# Patient Record
Sex: FEMALE | Race: WHITE | URBAN - METROPOLITAN AREA
[De-identification: names, ages, dates, MRNs, and addresses within clinical notes are randomized per-mention and may not be internally consistent; named-entity substitution may affect disease eponyms.]

---
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
## 2021-02-09 ENCOUNTER — HOSPITAL ENCOUNTER (INPATIENT)
Age: 21
LOS: 4 days | Discharge: HOME | DRG: 885 | End: 2021-02-14
Attending: PSYCHIATRY & NEUROLOGY | Admitting: PSYCHIATRY & NEUROLOGY
Payer: COMMERCIAL

## 2021-02-09 DIAGNOSIS — R45.851 DEPRESSION WITH SUICIDAL IDEATION: Primary | ICD-10-CM

## 2021-02-09 DIAGNOSIS — F32.A DEPRESSION WITH SUICIDAL IDEATION: Primary | ICD-10-CM

## 2021-02-09 PROCEDURE — 99283 EMERGENCY DEPT VISIT LOW MDM: CPT

## 2021-02-09 PROCEDURE — 80307 DRUG TEST PRSMV CHEM ANLYZR: CPT

## 2021-02-10 PROBLEM — F33.9 MAJOR DEPRESSIVE DISORDER, RECURRENT (HCC): Status: ACTIVE | Noted: 2021-02-10

## 2021-02-10 PROBLEM — F33.2 SEVERE EPISODE OF RECURRENT MAJOR DEPRESSIVE DISORDER, WITHOUT PSYCHOTIC FEATURES (HCC): Status: ACTIVE | Noted: 2021-02-10

## 2021-02-10 LAB
ACETAMINOPHEN LEVEL: < 5 UG/ML (ref 0–20)
ALBUMIN SERPL-MCNC: 4.4 G/DL (ref 3.5–5.1)
ALP BLD-CCNC: 34 U/L (ref 38–126)
ALT SERPL-CCNC: 8 U/L (ref 11–66)
AMPHETAMINE+METHAMPHETAMINE URINE SCREEN: NEGATIVE
ANION GAP SERPL CALCULATED.3IONS-SCNC: 6 MEQ/L (ref 8–16)
AST SERPL-CCNC: 10 U/L (ref 5–40)
BARBITURATE QUANTITATIVE URINE: NEGATIVE
BASOPHILS # BLD: 0.3 %
BASOPHILS ABSOLUTE: 0 THOU/MM3 (ref 0–0.1)
BENZODIAZEPINE QUANTITATIVE URINE: NEGATIVE
BILIRUB SERPL-MCNC: 0.2 MG/DL (ref 0.3–1.2)
BUN BLDV-MCNC: 8 MG/DL (ref 7–22)
CALCIUM SERPL-MCNC: 9.6 MG/DL (ref 8.5–10.5)
CANNABINOID QUANTITATIVE URINE: POSITIVE
CHLORIDE BLD-SCNC: 104 MEQ/L (ref 98–111)
CO2: 26 MEQ/L (ref 23–33)
COCAINE METABOLITE QUANTITATIVE URINE: NEGATIVE
CREAT SERPL-MCNC: 0.4 MG/DL (ref 0.4–1.2)
EOSINOPHIL # BLD: 0.6 %
EOSINOPHILS ABSOLUTE: 0 THOU/MM3 (ref 0–0.4)
ERYTHROCYTE [DISTWIDTH] IN BLOOD BY AUTOMATED COUNT: 12.9 % (ref 11.5–14.5)
ERYTHROCYTE [DISTWIDTH] IN BLOOD BY AUTOMATED COUNT: 42.3 FL (ref 35–45)
ETHYL ALCOHOL, SERUM: < 0.01 %
GFR SERPL CREATININE-BSD FRML MDRD: > 90 ML/MIN/1.73M2
GLUCOSE BLD-MCNC: 90 MG/DL (ref 70–108)
HCT VFR BLD CALC: 38.7 % (ref 37–47)
HEMOGLOBIN: 12.5 GM/DL (ref 12–16)
IMMATURE GRANS (ABS): 0.02 THOU/MM3 (ref 0–0.07)
IMMATURE GRANULOCYTES: 0.3 %
LYMPHOCYTES # BLD: 24.4 %
LYMPHOCYTES ABSOLUTE: 1.9 THOU/MM3 (ref 1–4.8)
MCH RBC QN AUTO: 29 PG (ref 26–33)
MCHC RBC AUTO-ENTMCNC: 32.3 GM/DL (ref 32.2–35.5)
MCV RBC AUTO: 89.8 FL (ref 81–99)
MONOCYTES # BLD: 9.2 %
MONOCYTES ABSOLUTE: 0.7 THOU/MM3 (ref 0.4–1.3)
NUCLEATED RED BLOOD CELLS: 0 /100 WBC
OPIATES, URINE: NEGATIVE
OSMOLALITY CALCULATION: 269.8 MOSMOL/KG (ref 275–300)
OXYCODONE: NEGATIVE
PHENCYCLIDINE QUANTITATIVE URINE: NEGATIVE
PLATELET # BLD: 325 THOU/MM3 (ref 130–400)
PMV BLD AUTO: 10.3 FL (ref 9.4–12.4)
POTASSIUM SERPL-SCNC: 4.3 MEQ/L (ref 3.5–5.2)
PREGNANCY, SERUM: NEGATIVE
RBC # BLD: 4.31 MILL/MM3 (ref 4.2–5.4)
SALICYLATE, SERUM: < 0.3 MG/DL (ref 2–10)
SARS-COV-2, NAAT: NOT DETECTED
SEG NEUTROPHILS: 65.2 %
SEGMENTED NEUTROPHILS ABSOLUTE COUNT: 5.1 THOU/MM3 (ref 1.8–7.7)
SODIUM BLD-SCNC: 136 MEQ/L (ref 135–145)
TOTAL PROTEIN: 7.3 G/DL (ref 6.1–8)
WBC # BLD: 7.8 THOU/MM3 (ref 4.8–10.8)

## 2021-02-10 PROCEDURE — 6370000000 HC RX 637 (ALT 250 FOR IP): Performed by: PSYCHIATRY & NEUROLOGY

## 2021-02-10 PROCEDURE — APPSS30 APP SPLIT SHARED TIME 16-30 MINUTES: Performed by: PHYSICIAN ASSISTANT

## 2021-02-10 PROCEDURE — 84703 CHORIONIC GONADOTROPIN ASSAY: CPT

## 2021-02-10 PROCEDURE — 80179 DRUG ASSAY SALICYLATE: CPT

## 2021-02-10 PROCEDURE — 80053 COMPREHEN METABOLIC PANEL: CPT

## 2021-02-10 PROCEDURE — 82077 ASSAY SPEC XCP UR&BREATH IA: CPT

## 2021-02-10 PROCEDURE — 90792 PSYCH DIAG EVAL W/MED SRVCS: CPT | Performed by: PSYCHIATRY & NEUROLOGY

## 2021-02-10 PROCEDURE — U0002 COVID-19 LAB TEST NON-CDC: HCPCS

## 2021-02-10 PROCEDURE — 1240000000 HC EMOTIONAL WELLNESS R&B

## 2021-02-10 PROCEDURE — 80143 DRUG ASSAY ACETAMINOPHEN: CPT

## 2021-02-10 PROCEDURE — 36415 COLL VENOUS BLD VENIPUNCTURE: CPT

## 2021-02-10 PROCEDURE — 6370000000 HC RX 637 (ALT 250 FOR IP): Performed by: PHYSICIAN ASSISTANT

## 2021-02-10 PROCEDURE — 85025 COMPLETE CBC W/AUTO DIFF WBC: CPT

## 2021-02-10 RX ORDER — IBUPROFEN 200 MG
400 TABLET ORAL EVERY 6 HOURS PRN
Status: DISCONTINUED | OUTPATIENT
Start: 2021-02-10 | End: 2021-02-14 | Stop reason: HOSPADM

## 2021-02-10 RX ORDER — VENLAFAXINE HYDROCHLORIDE 37.5 MG/1
37.5 CAPSULE, EXTENDED RELEASE ORAL
Status: DISCONTINUED | OUTPATIENT
Start: 2021-02-10 | End: 2021-02-14 | Stop reason: HOSPADM

## 2021-02-10 RX ORDER — TRAZODONE HYDROCHLORIDE 50 MG/1
50 TABLET ORAL NIGHTLY PRN
Status: DISCONTINUED | OUTPATIENT
Start: 2021-02-10 | End: 2021-02-14 | Stop reason: HOSPADM

## 2021-02-10 RX ORDER — LORAZEPAM 1 MG/1
2 TABLET ORAL EVERY 6 HOURS PRN
Status: DISCONTINUED | OUTPATIENT
Start: 2021-02-10 | End: 2021-02-14 | Stop reason: HOSPADM

## 2021-02-10 RX ORDER — LORAZEPAM 2 MG/ML
2 INJECTION INTRAMUSCULAR EVERY 6 HOURS PRN
Status: DISCONTINUED | OUTPATIENT
Start: 2021-02-10 | End: 2021-02-14 | Stop reason: HOSPADM

## 2021-02-10 RX ORDER — HYDROXYZINE HYDROCHLORIDE 25 MG/1
50 TABLET, FILM COATED ORAL 3 TIMES DAILY PRN
Status: DISCONTINUED | OUTPATIENT
Start: 2021-02-10 | End: 2021-02-14 | Stop reason: HOSPADM

## 2021-02-10 RX ORDER — HALOPERIDOL 5 MG
5 TABLET ORAL EVERY 6 HOURS PRN
Status: DISCONTINUED | OUTPATIENT
Start: 2021-02-10 | End: 2021-02-14 | Stop reason: HOSPADM

## 2021-02-10 RX ORDER — MAGNESIUM HYDROXIDE/ALUMINUM HYDROXICE/SIMETHICONE 120; 1200; 1200 MG/30ML; MG/30ML; MG/30ML
30 SUSPENSION ORAL EVERY 6 HOURS PRN
Status: DISCONTINUED | OUTPATIENT
Start: 2021-02-10 | End: 2021-02-14 | Stop reason: HOSPADM

## 2021-02-10 RX ORDER — HALOPERIDOL 5 MG/ML
5 INJECTION INTRAMUSCULAR EVERY 6 HOURS PRN
Status: DISCONTINUED | OUTPATIENT
Start: 2021-02-10 | End: 2021-02-14 | Stop reason: HOSPADM

## 2021-02-10 RX ORDER — ACETAMINOPHEN 325 MG/1
650 TABLET ORAL EVERY 4 HOURS PRN
Status: DISCONTINUED | OUTPATIENT
Start: 2021-02-10 | End: 2021-02-14 | Stop reason: HOSPADM

## 2021-02-10 RX ADMIN — VENLAFAXINE HYDROCHLORIDE 37.5 MG: 37.5 CAPSULE, EXTENDED RELEASE ORAL at 10:23

## 2021-02-10 RX ADMIN — IBUPROFEN 400 MG: 200 TABLET, FILM COATED ORAL at 06:38

## 2021-02-10 RX ADMIN — TRAZODONE HYDROCHLORIDE 50 MG: 50 TABLET ORAL at 23:55

## 2021-02-10 ASSESSMENT — SLEEP AND FATIGUE QUESTIONNAIRES
RESTFUL SLEEP: NO
SLEEP PATTERN: DIFFICULTY FALLING ASLEEP;DISTURBED/INTERRUPTED SLEEP;DIFFICULTY ARISING
DIFFICULTY STAYING ASLEEP: YES
DIFFICULTY FALLING ASLEEP: YES

## 2021-02-10 ASSESSMENT — PAIN SCALES - GENERAL
PAINLEVEL_OUTOF10: 0
PAINLEVEL_OUTOF10: 0

## 2021-02-10 ASSESSMENT — ENCOUNTER SYMPTOMS
NAUSEA: 0
ABDOMINAL PAIN: 0
RHINORRHEA: 0
COUGH: 0
VOMITING: 0
SHORTNESS OF BREATH: 0

## 2021-02-10 ASSESSMENT — PAIN DESCRIPTION - FREQUENCY: FREQUENCY: INTERMITTENT

## 2021-02-10 ASSESSMENT — PAIN DESCRIPTION - PROGRESSION: CLINICAL_PROGRESSION: NOT CHANGED

## 2021-02-10 NOTE — GROUP NOTE
Group Therapy Note    Date: 2/10/2021    Group Start Time: 1330  Group End Time: 1430  Group Topic: Psychotherapy    [facility] 4E    Gwynne Bumpers, LSW        Group Therapy Note    Attendees: 4         Patient's Goal:  Read through and discuss ABCs for achieving dreams and apply to mental health. Notes:  Patient was interactive and contributed to the group conversation in a positive way. Status After Intervention:  Improved    Participation Level:  Active Listener and Interactive    Participation Quality: Appropriate, Attentive, Sharing and Supportive      Speech:  normal      Thought Process/Content: Logical      Affective Functioning: Congruent      Mood: euthymic      Level of consciousness:  Alert, Oriented x4 and Attentive      Response to Learning: Able to verbalize current knowledge/experience, Able to verbalize/acknowledge new learning, Able to retain information, Capable of insight, Able to change behavior and Progressing to goal      Endings: None Reported    Modes of Intervention: Education, Support, Socialization and Problem-solving      Discipline Responsible: /Counselor      Signature:  Gwynne Bumpers, LSW

## 2021-02-10 NOTE — PATIENT CARE CONFERENCE
[facility]  Initial Interdisciplinary Treatment Plan NOTE    Review Date & Time: 02/10/21 2498    Patient was in treatment team.  See Multidisciplinary Treatment Team sheet for participants. Admission Type:   Admission Type: Voluntary    Reason for admission:  Reason for Admission: suicidal thoughts, depression      Estimated Length of Stay Update:  3-5 days  Estimated Discharge Date Update: 3-5 days    PATIENT STRENGTHS:  Patient Strengths Strengths: Communication, Employment  Patient Strengths and Limitations:Limitations: Difficulty problem solving/relies on others to help solve problems, Tendency to isolate self  Addictive Behavior:Addictive Behavior  In the past 3 months, have you felt or has someone told you that you have a problem with:  : None  Do you have a history of Chemical Use?: No  Do you have a history of Alcohol Use?: No  Do you have a history of Street Drug Abuse?: Yes  Medical Problems:History reviewed. No pertinent past medical history. EDUCATION:   Learner Progress Toward Treatment Goals: Reviewed results and recommendations of this team, Reviewed group plan and strategies, Reviewed signs, symptoms and risk of self harm and violent behavior and Reviewed goals and plan of care    Method: Individual    Outcome: Needs reinforcement    PATIENT GOALS: Patient did not state a goal    PLAN/TREATMENT RECOMMENDATIONS UPDATE:   1. What is the most important thing we can help you with while you are here? Patient did not state. 2. Who is your support system? \"I have none\"  3. Do you have follow-up providers? No  4. Do you have the ability to pay for your medications? Yes, eleni. 5. Where will you be residing when you leave the hospital? Home with mother  6. Will need a return to work slip or FMLA paper completion?  Yes      GOALS UPDATE:   Time frame for Short-Term Goals: Daily    Maco Wood

## 2021-02-10 NOTE — BH NOTE
Behavioral Health   Admission Note     Admission Type:   Admission Type: Voluntary    Reason for admission:  Reason for Admission: suicidal thoughts, depression    PATIENT STRENGTHS:  Strengths: Communication, Employment    Patient Strengths and Limitations:  Limitations: Difficulty problem solving/relies on others to help solve problems, Tendency to isolate self    Addictive Behavior:   Addictive Behavior  In the past 3 months, have you felt or has someone told you that you have a problem with:  : None  Do you have a history of Chemical Use?: No  Do you have a history of Alcohol Use?: No  Do you have a history of Street Drug Abuse?: Yes    Medical Problems:   History reviewed. No pertinent past medical history. Status EXAM:  Status and Exam  Normal: No  Facial Expression: Worried, Sad  Affect: Blunt  Level of Consciousness: Alert  Mood:Normal: No  Mood: Depressed  Motor Activity:Normal: Yes  Interview Behavior: Cooperative  Preception: Wykoff to Person, Obadiah Lexii to Time, Wykoff to Place, Wykoff to Situation  Attention:Normal: Yes  Thought Processes: Circumstantial  Thought Content:Normal: Yes  Hallucinations: None  Delusions: No  Memory:Normal: Yes  Insight and Judgment: No  Insight and Judgment: Poor Insight  Present Suicidal Ideation: No  Present Homicidal Ideation: No    Pt admitted with followings belongings:  Dentures: None  Vision - Corrective Lenses: None  Hearing Aid: None  Jewelry: Ring  Body Piercings Removed: N/A  Clothing: Sweater, Socks, Undergarments (Comment), Jacket / coat, Shirt, Footwear(hat)  Were All Patient Medications Collected?: Not Applicable  Other Valuables: Cell phone, Money (Comment), Wallet($10)     Admission order obtained yes. Valuables placed in lock up on 4e. Patient's home medications were none. Patient oriented to surroundings and program expectations and copy of patient rights given. Received admission packet:  yes. Consents reviewed, signed yes. Patient verbalize understanding:  yes. Patient education on precautions: yes           2 person skin assessment completed upon admission pt refused 2 nurse assessment, denies any skin issues. Explained patients right to have family, representative or physician notified of their admission. Patient has Declined for physician to be notified. Patient has Declined for family/representative to be notified. 21 yo female pt admitted from ED, voluntary admit. Pt reports depression, suicidal thoughts, denies a plan, decreased sleep. Pt reports she was on Wellbutrin in past but didn't feel like it was working so pt quit taking it. Pt reports she smokes approximately 2 grams marijuana daily. Pt states she lives with her mom, works at [employer] in [place]. Pt reports her sister went to intermediate a week ago and she has been worried about her. Pt denies suicidal thoughts at present, denies homicidal thoughts or hallucinations. Pt oriented to her room and the unit.            Adeline Guan RN

## 2021-02-10 NOTE — ED NOTES
Pt lying comfortably in bed, mother at bedside.  No needs at this time     Girma Hyde  02/10/21 0252

## 2021-02-10 NOTE — ED NOTES
Pt comes in with mother through lobby. She has been having suicidal thoughts. She denies plan or intention at this time. She denies past history of suicidal attempt.       Chelsea Calabrese RN  02/10/21 2487

## 2021-02-10 NOTE — H&P
Department of Psychiatry  Psychiatric Assessment   Reason for Admission to Psychiatric Unit:  Threat to self requiring 24 hour professional observation  Concerns about patient's safety in the community    CHIEF COMPLAINT:  Suicidal ideation    HISTORY OF PRESENT ILLNESS:      Hua Almonte is a 22 y.o. female with a history of depression and anxiety who presented to the emergency department under voluntary status for suicidal ideation. Per the ED social work note: Patient is single and has no children. She is employed at [employer]. Patient resides with her family who are supportive. Patient reports feeling numb, sad, and difficulty with sleep. Patient reports she is sad often with suicidal thoughts . Patient states ' Today I wanted to kill myself more then the other days'. Patient reports she was prescribed medication with [facility] for depression however has not been compliant for the past six months. Patient reports the medication was not helping. Patient is tearful with fair eye contact. Patient is cooperative with the interview. Patient denies delusions/hallucinations. Patience reports \"I had a bad night. \" When asked why she had a bad night, \"I don't know. \" She states she has been experiencing suicidal ideation for some time that was the worse its ever been yesterday. She is unsure how long she has been experiencing suicidal ideation. She denies any specific plan or intent. She reports she has been experiencing depression for awhile that has been progressively getting worse. She denies any specific stressors. Per 4E nursing staff, her sister went to snf a week ago and she has been worried about her. She endorses feeling down and sad for more days than not. She has not been sleeping well. She has trouble falling and staying asleep. She endorses racing thoughts at night about the past and future. She still feels tired in the morning when she wakes up most days. She has been eating okay at home. She has been having low energy and motivation. Attention and concentration have been poor. She has been feeling worthless, hopeless and helpless. She has been dealing with high anxiety recently. She reports she experiences racing thoughts and is unable to calm down with her anxiety. She has been having panic attacks. She is unsure how often she has been having them. She states she experiences racing thoughts, racing heart, trouble breathing and is unable to calm down when she has a panic attack. She states her panic attacks last for an hour. She has a history of anxiety and depression. She does not currently have an outpatient psychiatric provider or take psychotropic medications. She was previously seeing a provider at [facility] and was on Wellbutrin but has been off of it for over 6 months because she felt it wasn't working. She denies any past suicide attempts but does have a history of self harm by cutting. She reports she has not cut for a few months. She denies any past inpatient psychiatric admissions. She denies any alcohol abuse. She does smoke marijuana daily. UDS positive for cannabis only. Patience appears down and flat on examination. She exhibits poor eye contact and speaks softly. She reports she is tired today as she arrived late to the unit last night. She continues to feel depressed and anxious today. Denies current thoughts of harm to herself or others. Denies and does not exhibit any symptoms of morales or hypomania. Denies any hallucinations. No evidence of delusions or overt psychosis on examination. She is receptive to starting an antidepressant medication. PSYCHIATRIC HISTORY:      · Outpatient psychiatric provider: Denies currently. Was previously seeing [facility]   · Suicide attempts: History of cutting to relieve stress. Has not cut for the past few months. · Inpatient psychiatric admissions: Denies    Past psychiatric medications includes:   Wellbutrin   Zoloft   Lexapro    Adverse reactions from psychotropic medications:    Zoloft- \"made me want to kill myself\"      Past Medical History:    History reviewed. No pertinent past medical history. Past Surgical History:        Procedure Laterality Date    OTHER SURGICAL HISTORY      lymphnodes removed in neck    WISDOM TOOTH EXTRACTION         Medications Prior to Admission:   No medications prior to admission. Allergies:  Patient has no known allergies.     Social History:   BORN & RAISED IN [place]  · RESIDENCE: Currently lives with her mom in [place] · LEVEL OF EDUCATION: graduated high school. Semester at high school  · MARITAL STATUS: Never . Not currently in a relationship  · CHILDREN: None  · OCCUPATION: Works at [employer]. Has been working there for the past 2 years. · PATIENT ASSETS: mother supportive, employment    DRUG USE HISTORY  Social History     Tobacco Use   Smoking Status Never Smoker   Smokeless Tobacco Never Used     Social History     Substance and Sexual Activity   Alcohol Use Never    Frequency: Never     Social History     Substance and Sexual Activity   Drug Use Yes    Types: Marijuana     Smokes marijuana daily     LEGAL HISTORY:   HISTORY OF INCARCERATION: no    Family Psychiatric and Medical History:   Denies any significant family psychiatric history  Denies suicides in family  Denies substance use in family    History reviewed. No pertinent family history. Psychiatric Review of Systems      ·    Obsessions and Compulsions: denies  ·    Nay or Hypomania: denies  ·    Hallucinations: denies  ·    Panic Attacks:  yes  ·    Delusions:  denies  ·    Phobias: denies       Medical Review of Systems:     Constitutional: Negative for appetite change, diaphoresis, fatigue and fever. HENT: Negative for congestion, sore throat and tinnitus. Eyes: Negative for visual disturbance. Respiratory: Negative for cough, shortness of breath and wheezing. Cardiovascular: Negative for chest pain and leg swelling. Gastrointestinal: Negative for nausea, vomiting, diarrhea. Negative for abdominal pain. Genitourinary: Negative for frequency. Musculoskeletal: Negative for arthralgias, myalgias and neck stiffness. Skin: Negative for puritis. Neurological: Negative for dizziness, weakness and headaches. All other systems reviewed and are negative.       PHYSICAL EXAM:  Vitals: /77   Pulse 69   Temp 97.9 °F (36.6 °C) (Oral)   Resp 16   Ht 5' 6\" (1.676 m)   Wt 170 lb (77.1 kg)   LMP 01/04/2021 (Approximate)   SpO2 98%   Breastfeeding No   BMI 27.44 kg/m²       Physical Exam:    Constitutional: Well developed, well nourished, no acute distress  Eyes: Pupils round and reactive to light bilaterally  Neck:  Supple, no thyromegaly. Cardiovascular:  Normal rate and rhythm, normal S1 and S2. No murmur or gallop on auscultation. Radial pulses 2+ and brisk bilaterally  Lungs: Clear to auscultation bilaterally without wheezing or rales. Musculoskeletal:  Full range of motion in all four extremities. Neurologic:  Cranial nerves II through XII are grossly intact. Normal gait and station. Mental Status Examination:    Level of consciousness:  awake  Appearance:  well-appearing, hospital attire, in chair, fair grooming and fair hygiene  Behavior/Motor:  psychomotor retardation  Attitude toward examiner:  cooperative, attentive and poor eye contact  Speech:  Low volume, slow and monotone  Mood:  Depressed  Affect:  flat  Thought processes:  linear, goal directed and coherent  Thought content:  Denies homicidal ideation  Suicidal Ideation:  Denies current suicidal ideation  Delusions:  no evidence of delusions  Perceptual Disturbance:  denies any perceptual disturbance  Cognition: Patient is oriented to person, place, time and situation  Concentration: clinically adequate  Memory: intact  Insight & Judgement: fair         DSM-5 DIAGNOSIS:    Severe episode 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 No pertinent past medical history.        TREATMENT PLAN Yenny Jacob is a 22 y.o. female being evaluated by a Virtual Visit (video visit) encounter to address concerns as mentioned above. A caregiver was present in the room along with the patient. Pursuant to the emergency declaration under the [facility], [address] [place] [address] authority and the [employer] Act, this Virtual Visit was conducted with patient's (and/or legal guardian's) consent, to reduce the patient's risk of exposure to COVID-19 and provide necessary medical care. Services were provided through a video synchronous discussion virtually to substitute for in-person visit by provider. Patient is present at [address] [place] [address], [address] [place] [address] and I am physically present at [place], [place]    --Sulma Gilbert MD on 2/10/2021 at 4:35 PM    An electronic signature was used to authenticate this note. **This report has been created using voice recognition software. It may contain minor errors which are inherent in voice recognition technology. **

## 2021-02-10 NOTE — PLAN OF CARE
Problem: Pain:  Goal: Control of acute pain  Description: Control of acute pain  Outcome: Met This Shift  Note: Pt denied any pain at this time      Problem: Depressive Behavior With or Without Suicide Precautions:  Goal: Able to verbalize and/or display a decrease in depressive symptoms  Description: Able to verbalize and/or display a decrease in depressive symptoms  Outcome: Ongoing  Note: Pt affect flat. Pt blunt and denied any SI at this time   Goal: Ability to disclose and discuss suicidal ideas will improve  Description: Ability to disclose and discuss suicidal ideas will improve  Outcome: Ongoing  Note: Pt denied any SI   Goal: Participates in care planning  Description: Participates in care planning  Outcome: Ongoing  Note: Patient discussed treatment plan with physician/medical staff, attending group, and compliant with medications. Problem: Discharge Planning:  Goal: Discharged to appropriate level of care  Description: Discharged to appropriate level of care  Outcome: Ongoing  Note: Patient voices emotional  needs before discharge. Patient plans to be discharged to home with her Mom. Discharge planner working with patient to achieve optimal discharge plans, specific to individual needs. Problem: Activity:  Goal: Sleeping patterns will improve  Description: Sleeping patterns will improve  Outcome: Completed  Note: Patient slept 1.5 hours last night, reports they slept well. Encourage patient not to take naps during the day, relax several hours before bed and to take prescribed sleep meds as ordered. Patient  verbalized understanding.

## 2021-02-10 NOTE — PLAN OF CARE
Patient has attended at least one group today and has also been out of her room at times for social interaction with others so she is working toward her socialization goal for this shift. Patient will be encouraged to attend all groups on the unit daily during her hospital stay.

## 2021-02-10 NOTE — ED PROVIDER NOTES
[facility] EMERGENCY DEPT      CHIEF COMPLAINT       Chief Complaint   Patient presents with    Suicidal       Nurses Notes reviewed and I agree except as noted in the HPI. HISTORY OF PRESENT ILLNESS    Niyah Brar is a 22 y.o. female who presents for depression with suicidal ideation. Patient did not want to talk about why she was here to me. Patient denies all medical complaints. Per Yamileth Christie from [facility] patient has a history of depression and stopped taking her medications 6 months ago. Depression has worsened especially 2 weeks ago after her sister went to [facility]. Patient has been having trouble sleeping and admits to having suicidal thoughts. She did not share if she had a plan. Patient denies any ingestion or self injury to me. Patient does not smoke tobacco or drink alcohol. Patient admits to smoking marijuana. Patient denies chance of pregnancy. REVIEW OF SYSTEMS     Review of Systems   Constitutional: Negative for appetite change and fever. HENT: Negative for congestion and rhinorrhea. Eyes: Negative for visual disturbance. Respiratory: Negative for cough and shortness of breath. Cardiovascular: Negative for chest pain. Gastrointestinal: Negative for abdominal pain, nausea and vomiting. Genitourinary: Negative for decreased urine volume. Musculoskeletal: Negative for gait problem. Skin: Negative for rash and wound. Neurological: Negative for headaches. Psychiatric/Behavioral: Positive for dysphoric mood, sleep disturbance and suicidal ideas. Negative for hallucinations and self-injury. PAST MEDICAL HISTORY    has no past medical history on file. SURGICAL HISTORY      has no past surgical history on file. CURRENT MEDICATIONS       Previous Medications    No medications on file       ALLERGIES     has No Known Allergies. FAMILY HISTORY     has no family status information on file. family history is not on file.     SOCIAL HISTORY reports that she has never smoked. She has never used smokeless tobacco. She reports previous drug use. PHYSICAL EXAM     INITIAL VITALS:  height is 5' 6\" (1.676 m) and weight is 170 lb (77.1 kg). Her oral temperature is 98.2 °F (36.8 °C). Her blood pressure is 140/83 (abnormal) and her pulse is 86. Her respiration is 17 and oxygen saturation is 99%. Physical Exam  Vitals signs and nursing note reviewed. Constitutional:       General: She is not in acute distress. Appearance: She is well-developed. She is not toxic-appearing or diaphoretic. HENT:      Head: Normocephalic and atraumatic. Right Ear: Hearing normal.      Left Ear: Hearing normal.      Nose: Nose normal. No rhinorrhea. Mouth/Throat:      Pharynx: Uvula midline. No oropharyngeal exudate. Eyes:      General: Lids are normal. No scleral icterus. Conjunctiva/sclera: Conjunctivae normal.      Pupils: Pupils are equal, round, and reactive to light. Neck:      Musculoskeletal: Normal range of motion and neck supple. No neck rigidity. Trachea: No tracheal deviation. Cardiovascular:      Rate and Rhythm: Normal rate and regular rhythm. Heart sounds: Normal heart sounds. No murmur. Pulmonary:      Effort: Pulmonary effort is normal. No respiratory distress. Breath sounds: Normal breath sounds. No stridor. No decreased breath sounds or wheezing. Abdominal:      General: There is no distension. Palpations: Abdomen is soft. Abdomen is not rigid. Tenderness: There is no abdominal tenderness. Musculoskeletal: Normal range of motion. Comments: Movement normal as observed   Lymphadenopathy:      Cervical: No cervical adenopathy. Skin:     General: Skin is warm and dry. Coloration: Skin is not pale. Findings: No rash. Neurological:      Mental Status: She is alert and oriented to person, place, and time.       Gait: Gait normal.      Comments: No gross deficits observed Psychiatric:         Mood and Affect: Mood is depressed. Speech: Speech normal.         Behavior: Behavior normal. Behavior is cooperative. Thought Content: Thought content includes suicidal ideation. Comments: Quiet, poor eye contact, guarded         DIFFERENTIAL DIAGNOSIS:   Including but not limited to: Depression, suicidal ideation, adjustment disorder, bipolar    DIAGNOSTIC RESULTS     EKG: All EKG's are interpreted by [facility] Department Physician who either signs or Co-signs this chart in the absence of a cardiologist.  None    RADIOLOGY: non-plain film images(s) such as CT,Ultrasound and MRI are read by the radiologist.  Plain radiographic images are visualized and preliminarily interpreted by the emergency physician unless otherwise stated below.   No orders to display       LABS:   Labs Reviewed   COMPREHENSIVE METABOLIC PANEL - Abnormal; Notable for the following components:       Result Value    Alkaline Phosphatase 34 (*)     Total Bilirubin 0.2 (*)     ALT 8 (*)     All other components within normal limits   SALICYLATE LEVEL - Abnormal; Notable for the following components:    Salicylate, Serum < 0.3 (*)     All other components within normal limits   OSMOLALITY - Abnormal; Notable for the following components:    Osmolality Calc 269.8 (*)     All other components within normal limits   ANION GAP - Abnormal; Notable for the following components:    Anion Gap 6.0 (*)     All other components within normal limits   URINE DRUG SCREEN   CBC WITH AUTO DIFFERENTIAL   ACETAMINOPHEN LEVEL   ETHANOL   HCG, SERUM, QUALITATIVE   GLOMERULAR FILTRATION RATE, ESTIMATED   COVID-19   COVID-19       EMERGENCY DEPARTMENT COURSE:   Vitals:    Vitals:    02/09/21 2343   BP: (!) 140/83   Pulse: 86   Resp: 17   Temp: 98.2 °F (36.8 °C)   TempSrc: Oral   SpO2: 99%   Weight: 170 lb (77.1 kg)   Height: 5' 6\" (1.676 m)       MDM: The patient was seen in emergency room by me for pression with suicidal ideation. Old records were reviewed. Physical exam revealed a quiet depressed patient. Appropriate labs were ordered. The patient was closely observed and vital signs monitored. Labs were reviewed upon completion. Labs reflected no acute abnormalities. The patient was thoroughly evaluated by Fox Noland from [facility], who consulted Dr. Charla Holden of psychiatry, who advised admission. The patient was admitted to the hospital voluntarily in fair condition. All questions were addressed. CRITICAL CARE:   None    CONSULTS:  None    PROCEDURES:  None    FINAL IMPRESSION      1. Depression with suicidal ideation          DISPOSITION/PLAN     1.  Depression with suicidal ideation    Admission    (Please note that portions of this note were completed with a voice recognition program.  Efforts were made to edit the dictations but occasionally words are mis-transcribed.)    Jennifer Lua PA-C 02/10/21 2:30 AM    ADAMS Hayden PA-C  02/10/21 0236

## 2021-02-10 NOTE — PROGRESS NOTES
Provisional Diagnosis:   Major Depressive Disorder Recurrent Moderate without Psychotic Features. Risk, Psychosocial and Contextual Factors: No current treatment for mental health/substance use. .     Current MH Treatment: History with [facility], stopped taking medication six months ago        Present Suicidal Behavior:      Verbal: xxx         Attempt: Denies    Access to Weapons:  Denies    Current Suicide Risk: Low, Moderate or High:  Moderate      Past Suicidal Behavior:       Verbal: xxx    Attempt: Denies    Self-Injurious/Self-Mutilation: Denies    Traumatic Event Within Past 2 Weeks:   Sister is incarcerated. Current Abuse: Denies    Legal: Denies    Violence:  Denies     Protective Factors:  Family support. Housing:    Resides with family. CPAP/Oxygen/Ambulation Difficulties: NA    Basic Vital Signs Normal?: Check with Patients Nurse prior to Calling Psychiatry    Critical Labs?: Check with Patients Nurse prior to Calling Psychiatry    Clinical Summary:      Patient is a [de-identified] year old female presenting with her mother under voluntary status. Patient is single and has no children. She is employed at [employer]. Patient resides with her family who are supportive. Patient reports feeling numb, sad, and difficulty with sleep. Patient reports she is sad often with suicidal thoughts . Patient states ' Today I wanted to kill myself more then the other days'. Patient reports she was prescribed medication with [facility] for depression however has not been compliant for the past six months. Patient reports the medication was not helping. Patient is tearful with fair eye contact. Patient is cooperative with the interview. Patient denies delusions/hallucinations. 01:30 Patient is awake, mother at bedside. Monitored by frooly. 02:30 Patient is resting. Mother at bedside.  Monitored by frooly  Level of Care Disposition: Consulted with Hilda Garcia PA-C concerning the mental status of patient. Consulted with patients RN about abnormalities or medical concerns. Consulted with Dr. Fam Hall concerning the mental status of patient. Patient is accepted to the care of Dr. Fam Hall under voluntary status for mental health/substance use treatment.

## 2021-02-10 NOTE — BH NOTE
COORDINATION: CK - Patient in bed. MOBILITY:   Ambulates independently      GOALS:  (1) Identify 2 new positive coping skills by time of discharge. (2) Increase socialization by attending groups and coming out of her room for social interaction daily.

## 2021-02-10 NOTE — GROUP NOTE
Group Therapy Note    Date: 2/10/2021    Group Start Time: 1100  Group End Time: 1648  Group Topic: Healthy Living/Wellness    [facility] Adult [facility] 4E    Mariana Lanier RN        Group Therapy Note    Attendees: 3Topic     Start 1100    End 4337    Participation Active       Response Receptive to group subject matter      Behavior appropriate

## 2021-02-10 NOTE — PROGRESS NOTES
[facility] Biopsychosocial Assessment    Current Level of Psychosocial Functioning     Independent XXX  Dependent    Minimal Assist     Comments:      Psychosocial High Risk Factors (check all that apply)    Unable to obtain meds   Chronic illness/pain    Substance abuse   Lack of Family Support XXX  Financial stress   Isolation XXX  Inadequate Community Resources  Suicide attempt(s)  Not taking medications   Victim of crime   Developmental Delay  Unable to manage personal needs    Age 72 or older   Homeless  No transportation   Readmission within 30 days  Unemployment  Traumatic Event    Comments:   Sexual Orientation:  Heterosexual    Patient Strengths: Employment    Patient Barriers: Difficulty problem solving, isolate self    Plan of Care     medication management, group/individual therapies, family meetings, psycho -education, treatment team meetings to assist with stabilization    Initial Discharge Plan:        Clinical Summary:  Patient is a 22year old female who presents to the unit under voluntary status. Patient was brought in by her mother due to feeling sad and frequent suicidal thoughts. Patient has not been previously admitted to the unit. When asked while she was here, Patient states \"Iwas just having a bad day worse than usual.\" Patient was very guarded and hostile during the assessment. Patient states she did not have a plan to commit suicide, but has constant suicide thoughts daily. Patient does not give homicidal ideations. Patient states she lives with her mother at home. Patient currently does not see an outpatient provider. Patient states she currently works at [employer]. Patient denies alcohol or drug abuse.

## 2021-02-11 PROCEDURE — 1240000000 HC EMOTIONAL WELLNESS R&B

## 2021-02-11 PROCEDURE — 6370000000 HC RX 637 (ALT 250 FOR IP): Performed by: PSYCHIATRY & NEUROLOGY

## 2021-02-11 PROCEDURE — 90833 PSYTX W PT W E/M 30 MIN: CPT | Performed by: PSYCHIATRY & NEUROLOGY

## 2021-02-11 PROCEDURE — 6370000000 HC RX 637 (ALT 250 FOR IP): Performed by: PHYSICIAN ASSISTANT

## 2021-02-11 PROCEDURE — 99232 SBSQ HOSP IP/OBS MODERATE 35: CPT | Performed by: PSYCHIATRY & NEUROLOGY

## 2021-02-11 RX ADMIN — TRAZODONE HYDROCHLORIDE 50 MG: 50 TABLET ORAL at 21:54

## 2021-02-11 RX ADMIN — VENLAFAXINE HYDROCHLORIDE 37.5 MG: 37.5 CAPSULE, EXTENDED RELEASE ORAL at 07:44

## 2021-02-11 ASSESSMENT — PAIN SCALES - GENERAL: PAINLEVEL_OUTOF10: 0

## 2021-02-11 NOTE — PROGRESS NOTES
height is 5' 6\" (1.676 m) and weight is 170 lb (77.1 kg). Her oral temperature is 98 °F (36.7 °C). Her blood pressure is 120/60 and her pulse is 73. Her respiration is 16 and oxygen saturation is 98%. Labs:   Admission on 02/09/2021   Component Date Value Ref Range Status    AMPHETAMINE+METHAMPHETAMINE URINE * 02/09/2021 Negative  NEGATIVE Final    Barbiturate Quant, Ur 02/09/2021 Negative  NEGATIVE Final    Benzodiazepine Quant, Ur 02/09/2021 Negative  NEGATIVE Final    Cannabinoid Quant, Ur 02/09/2021 POSITIVE  NEGATIVE Final    Cocaine Metab Quant, Ur 02/09/2021 Negative  NEGATIVE Final    Opiates, Urine 02/09/2021 Negative  NEGATIVE Final    Oxycodone 02/09/2021 Negative  NEGATIVE Final    PCP Quant, Ur 02/09/2021 Negative  NEGATIVE Final    Comment: A \"Negative\" result for a drug abuse screen test indicates     that the drug concentration is below the following cutoffs:            Amphetamine/Methamphetamine     1000 ng/ml            Barbiturate                      200 ng/ml            Benzodiazapine                   200 ng/ml            Cannabinoids                      50 ng/ml            Cocaine Metabolite               300 ng/ml            Opiates                          300 ng/ml            Oxycodone                        100 ng/ml            Phencyclidine                     25 ng/ml  A \"Positive\" result for a drug abuse screen test should be     considered presumptive positive until/unless confirmed     by another method. (Additional request)  Quantitative values from a reference laboratory are     available upon additional request.  These results are for medical use only.   Performed at [address] [place] [address], [address] [place] [address] WBC 02/10/2021 7.8  4.8 - 10.8 thou/mm3 Final    RBC 02/10/2021 4.31  4.20 - 5.40 mill/mm3 Final    Hemoglobin 02/10/2021 12.5  12.0 - 16.0 gm/dl Final    Hematocrit 02/10/2021 38.7  37.0 - 47.0 % Final  MCV 02/10/2021 89.8  81.0 - 99.0 fL Final    MCH 02/10/2021 29.0  26.0 - 33.0 pg Final    MCHC 02/10/2021 32.3  32.2 - 35.5 gm/dl Final    RDW-CV 02/10/2021 12.9  11.5 - 14.5 % Final    RDW-SD 02/10/2021 42.3  35.0 - 45.0 fL Final    Platelets 94/65/5614 325  130 - 400 thou/mm3 Final    MPV 02/10/2021 10.3  9.4 - 12.4 fL Final    Seg Neutrophils 02/10/2021 65.2  % Final    Lymphocytes 02/10/2021 24.4  % Final    Monocytes 02/10/2021 9.2  % Final    Eosinophils 02/10/2021 0.6  % Final    Basophils 02/10/2021 0.3  % Final    Immature Granulocytes 02/10/2021 0.3  % Final    Segs Absolute 02/10/2021 5.1  1.8 - 7.7 thou/mm3 Final    Lymphocytes Absolute 02/10/2021 1.9  1.0 - 4.8 thou/mm3 Final    Monocytes Absolute 02/10/2021 0.7  0.4 - 1.3 thou/mm3 Final    Eosinophils Absolute 02/10/2021 0.0  0.0 - 0.4 thou/mm3 Final    Basophils Absolute 02/10/2021 0.0  0.0 - 0.1 thou/mm3 Final    Immature Grans (Abs) 02/10/2021 0.02  0.00 - 0.07 thou/mm3 Final    nRBC 02/10/2021 0  /100 wbc Final    Performed at [facility], [address]    Glucose 02/10/2021 90  70 - 108 mg/dL Final    CREATININE 02/10/2021 0.4  0.4 - 1.2 mg/dL Final    BUN 02/10/2021 8  7 - 22 mg/dL Final    Sodium 02/10/2021 136  135 - 145 meq/L Final    Potassium 02/10/2021 4.3  3.5 - 5.2 meq/L Final    Chloride 02/10/2021 104  98 - 111 meq/L Final    CO2 02/10/2021 26  23 - 33 meq/L Final    Calcium 02/10/2021 9.6  8.5 - 10.5 mg/dL Final    AST 02/10/2021 10  5 - 40 U/L Final    Alkaline Phosphatase 02/10/2021 34* 38 - 126 U/L Final    Total Protein 02/10/2021 7.3  6.1 - 8.0 g/dL Final    Albumin 02/10/2021 4.4  3.5 - 5.1 g/dL Final    Total Bilirubin 02/10/2021 0.2* 0.3 - 1.2 mg/dL Final    ALT 02/10/2021 8* 11 - 66 U/L Final    Performed at [address] [place] [id number]    Acetaminophen Level 02/10/2021 < 5.0  0.0 - 20.0 ug/mL Final Electronically signed by Hanna Gutierrez MD on 2/11/21 at 5:46 PM EST    **This report has been created using voice recognition software. It may contain minor errors which are inherent in voice recognition technology. **

## 2021-02-11 NOTE — PLAN OF CARE
Problem: Depressive Behavior With or Without Suicide Precautions:  Goal: Able to verbalize and/or display a decrease in depressive symptoms  Description: Able to verbalize and/or display a decrease in depressive symptoms  2/11/2021 0837 by Filomena Gandara RN  Outcome: Ongoing  Note: Pt affect flat but she brightens with peers   2/10/2021 2335 by Rosey Armendariz RN  Outcome: Ongoing  Note: Patient states she continues to feel moderately depressed this shift. Patient noted with a blunt affect and brightens with interaction. Goal: Ability to disclose and discuss suicidal ideas will improve  Description: Ability to disclose and discuss suicidal ideas will improve  2/11/2021 0837 by Filomena Gandara RN  Outcome: Ongoing  Note: Pt denied any SI   2/10/2021 2335 by Rosey Armendariz RN  Outcome: Ongoing  Note: Patient denies any suicidal thoughts so far this shift. Goal: Participates in care planning  Description: Participates in care planning  2/11/2021 0837 by Filomena Gandara RN  Outcome: Ongoing  Note: Patient discussed treatment plan with physician/medical staff, attending group, and compliant with medications. 2/10/2021 2335 by Rosey Armendariz RN  Outcome: Ongoing  Note: Care plan reviewed with patient and fair understanding verbalized. Problem: Discharge Planning:  Goal: Discharged to appropriate level of care  Description: Discharged to appropriate level of care  2/11/2021 0837 by Filomena Gandara RN  Outcome: Ongoing  Note: Patient voices emotional  needs before discharge. Patient plans to be discharged to home with her Mom. Discharge planner working with patient to achieve optimal discharge plans, specific to individual needs. 2/10/2021 2335 by Rosey Armendariz RN  Outcome: Ongoing  Note: Patient states she plans to return home with her mother at discharge.      Problem: Pain:  Goal: Control of acute pain  Description: Control of acute pain  2/11/2021 0837 by Filomena Gandara RN Outcome: Completed  Note: Pt denied any pain  2/10/2021 2335 by Aishwarya Rodriguez RN  Outcome: Ongoing  Note: Patient denies any pain so far this shift. Problem: Coping:  Goal: Ability to identify problematic behaviors that deter socialization will improve  Description: Ability to identify problematic behaviors that deter socialization will improve  2/11/2021 0837 by Chemo Turner RN  Outcome: Completed  Note: Pt able to be appropriately social with peers. 2/10/2021 2335 by Aishwarya Rodriguez RN  Outcome: Ongoing  Note: Ongoing.

## 2021-02-11 NOTE — GROUP NOTE
Group Therapy Note    Date: 2/11/2021    Group Start Time: 1330  Group End Time: 0950  Group Topic: Psychotherapy    [facility] 4E    FÉLIX Otero        Group Therapy Note    Attendees: 5         Patient's Goal:  Identify skills to use upon discharge when feeling anxious, sad, overwhelmed, etc.     Notes:  Patient contributed to group conversation in a positive way. Patient was pleasant and encouraged peers to participate as well. Status After Intervention:  Improved    Participation Level:  Active Listener and Interactive    Participation Quality: Appropriate, Attentive, Sharing and Supportive      Speech:  normal      Thought Process/Content: Logical      Affective Functioning: Congruent      Mood: euthymic      Level of consciousness:  Alert, Oriented x4 and Attentive      Response to Learning: Able to verbalize current knowledge/experience, Able to verbalize/acknowledge new learning, Able to retain information, Capable of insight, Able to change behavior and Progressing to goal      Endings: None Reported    Modes of Intervention: Support, Socialization and Problem-solving      Discipline Responsible: /Counselor

## 2021-02-11 NOTE — PLAN OF CARE
Patient has attended at least 2 groups today and has also been out of her room to socialize with others at times so she has met her socialization goal for this shift.

## 2021-02-11 NOTE — BH NOTE
PLAN OF CARE:     Start Time: 0900  End Time:  0930    Group Topic:  Daily Goals    Group Type:   Goal Group    Intervention/Goal:  To increase support and identify daily goals    Attendance:  attended    Affect:   restricted    Behavior:  Cooperative but evasive    Response:  Identified a daily goal    Daily Goal:    Talk to the doctor. Come to groups.      Progress:  Goal met

## 2021-02-11 NOTE — BH NOTE
Group Therapy Note    Date: 2/10/2021  Start Time: 2000  End Time:  2020  Number of Participants: 1    Type of Group: Wrap-Up/Relaxation    Wellness Binder Information  Module Name:  None  Session Number:  None    Patient's Goal:  To be ok    Notes:  Ongoing    Status After Intervention:  Improved    Participation Level: Interactive    Participation Quality: Appropriate      Speech:  normal      Thought Process/Content: Logical      Affective Functioning: Blunted      Mood: depressed      Level of consciousness:  Oriented x4      Response to Learning: Capable of insight      Endings: None Reported    Modes of Intervention: Education      Discipline Responsible: Registered Nurse      Signature:   Kuldip Bonner RN

## 2021-02-11 NOTE — PLAN OF CARE
Problem: Depressive Behavior With or Without Suicide Precautions:  Goal: Able to verbalize and/or display a decrease in depressive symptoms  Description: Able to verbalize and/or display a decrease in depressive symptoms  2/10/2021 2335 by Freddy George RN  Outcome: Ongoing  Note: Patient states she continues to feel moderately depressed this shift. Patient noted with a blunt affect and brightens with interaction. 2/10/2021 1035 by Messi Paige RN  Outcome: Ongoing  Note: Pt affect flat. Pt blunt and denied any SI at this time   Goal: Ability to disclose and discuss suicidal ideas will improve  Description: Ability to disclose and discuss suicidal ideas will improve  2/10/2021 2335 by Freddy George RN  Outcome: Ongoing  Note: Patient denies any suicidal thoughts so far this shift. 2/10/2021 1035 by Messi Paige RN  Outcome: Ongoing  Note: Pt denied any SI   Goal: Participates in care planning  Description: Participates in care planning  2/10/2021 2335 by Freddy George RN  Outcome: Ongoing  Note: Care plan reviewed with patient and fair understanding verbalized. 2/10/2021 1035 by Messi Paige RN  Outcome: Ongoing  Note: Patient discussed treatment plan with physician/medical staff, attending group, and compliant with medications. Problem: Discharge Planning:  Goal: Discharged to appropriate level of care  Description: Discharged to appropriate level of care  2/10/2021 2335 by Freddy eGorge RN  Outcome: Ongoing  Note: Patient states she plans to return home with her mother at discharge. 2/10/2021 1035 by Messi Paige RN  Outcome: Ongoing  Note: Patient voices emotional  needs before discharge. Patient plans to be discharged to home with her Mom. Discharge planner working with patient to achieve optimal discharge plans, specific to individual needs. Problem:  Activity:  Goal: Sleeping patterns will improve  Description: Sleeping patterns will improve 2/10/2021 1035 by Chemo uTrner RN  Outcome: Completed  Note: Patient slept 1.5 hours last night, reports they slept well. Encourage patient not to take naps during the day, relax several hours before bed and to take prescribed sleep meds as ordered. Patient  verbalized understanding. Problem: Pain:  Goal: Control of acute pain  Description: Control of acute pain  2/10/2021 2335 by Aishwarya Rodriguez RN  Outcome: Ongoing  Note: Patient denies any pain so far this shift. 2/10/2021 1035 by Chemo Turner RN  Outcome: Met This Shift  Note: Pt denied any pain at this time      Problem: Coping:  Goal: Ability to identify problematic behaviors that deter socialization will improve  Description: Ability to identify problematic behaviors that deter socialization will improve  Outcome: Ongoing  Note: Ongoing. Care plan reviewed with patient.   Patient does verbalize understanding of the plan of care and does contribute to goal setting

## 2021-02-12 PROCEDURE — 6370000000 HC RX 637 (ALT 250 FOR IP): Performed by: PHYSICIAN ASSISTANT

## 2021-02-12 PROCEDURE — 1240000000 HC EMOTIONAL WELLNESS R&B

## 2021-02-12 PROCEDURE — APPSS30 APP SPLIT SHARED TIME 16-30 MINUTES: Performed by: PHYSICIAN ASSISTANT

## 2021-02-12 PROCEDURE — 99232 SBSQ HOSP IP/OBS MODERATE 35: CPT | Performed by: PSYCHIATRY & NEUROLOGY

## 2021-02-12 PROCEDURE — 90833 PSYTX W PT W E/M 30 MIN: CPT | Performed by: PSYCHIATRY & NEUROLOGY

## 2021-02-12 PROCEDURE — 6370000000 HC RX 637 (ALT 250 FOR IP): Performed by: PSYCHIATRY & NEUROLOGY

## 2021-02-12 RX ADMIN — VENLAFAXINE HYDROCHLORIDE 37.5 MG: 37.5 CAPSULE, EXTENDED RELEASE ORAL at 07:49

## 2021-02-12 RX ADMIN — TRAZODONE HYDROCHLORIDE 50 MG: 50 TABLET ORAL at 21:03

## 2021-02-12 ASSESSMENT — PAIN SCALES - GENERAL: PAINLEVEL_OUTOF10: 0

## 2021-02-12 NOTE — PROGRESS NOTES
Group Therapy Note    Date: 2/11/2021  Start Time: 2000  End Time:  2020  Number of Participants:     Type of Group: Wrap-Up    Wellness Binder Information  Module Name:    Session Number:      Patient's Goal:  To go to groups     Notes: Attended groups     Status After Intervention:  Unchanged    Participation Level:  Active Listener and Interactive    Participation Quality: appropriate       Speech:  normal      Thought Process/Content: Logical      Affective Functioning: Flat      Mood: depressed      Level of consciousness:  Alert      Response to Learning: Able to verbalize current knowledge/experience, Able to verbalize/acknowledge new learning and Able to retain information      Endings: None Reported    Modes of Intervention: Support and Socialization      Discipline Responsible: Registered Nurse      Signature:  Chi Hall RN

## 2021-02-12 NOTE — BH NOTE
Group Therapy Note    Date: 2/12/2021     Start Time: 1000  End Time:   1704    Number of Participants:  8    Type of Group: Recreational/Social    Patient's Goal:  Increase socialization and concentration. Notes:   Social with others at times. Patient demonstrated good concentration while participating in [facility].      Status After Intervention:  Improved    Participation Level: Interactive    Participation Quality: Appropriate and Attentive      Speech:  hesitant      Thought Process/Content: Logical      Affective Functioning: Congruent      Mood: euthymic      Level of consciousness:  Oriented x4      Response to Learning: Progressing to goal      Endings: None Reported    Modes of Intervention: Socialization and Activity      Discipline Responsible: Psychoeducational Specialist      Signature:  Dane Sever

## 2021-02-12 NOTE — PLAN OF CARE
Problem: Depressive Behavior With or Without Suicide Precautions:  Goal: Able to verbalize and/or display a decrease in depressive symptoms  Description: Able to verbalize and/or display a decrease in depressive symptoms  2/12/2021 0934 by Lakia Guo RN  Outcome: Ongoing  Note: Pr affect flat. Pt denied any SI at this time   2/11/2021 2342 by Akin Perez RN  Outcome: Met This Shift  Note: States her depression is less. Rates her mood a 6 with 10 being the best. Affect flat, sad. Fair eye contact. States she is hopeful for the future. Select peer interaction with a male peer. Goal: Participates in care planning  Description: Participates in care planning  2/12/2021 0934 by Lakia Guo RN  Outcome: Ongoing  Note: Patient discussed treatment plan with physician/medical staff, attending group, and compliant with medications. 2/11/2021 2342 by Akin Perez RN  Outcome: Met This Shift  Note: Care plan reviewed with patient and verbalize understanding of the plan of care and contribute to goal setting. Problem: Discharge Planning:  Goal: Discharged to appropriate level of care  Description: Discharged to appropriate level of care  2/12/2021 0934 by Lakia Guo RN  Outcome: Ongoing  Note: Patient voices no needs before discharge. Patient plans to be discharged to home . Discharge planner working with patient to achieve optimal discharge plans, specific to individual needs. 2/11/2021 2342 by Akin Perez RN  Outcome: Ongoing  Note: Discharge planning in progress.       Problem: Coping:  Goal: Ability to identify problematic behaviors that deter socialization will improve  Description: Ability to identify problematic behaviors that deter socialization will improve  2/12/2021 0934 by Lakia Guo RN  Outcome: Ongoing  Note: Pt interacting well with staff and select peers  2/11/2021 2342 by Akin Perez RN  Outcome: Ongoing Problem: Depressive Behavior With or Without Suicide Precautions:  Goal: Ability to disclose and discuss suicidal ideas will improve  Description: Ability to disclose and discuss suicidal ideas will improve  2/12/2021 0934 by Tiago Durham RN  Outcome: Completed  2/11/2021 2342 by Franky Barraza RN  Outcome: Met This Shift  Note: Denies suicidal ideations.

## 2021-02-12 NOTE — PROGRESS NOTES
Department of Psychiatry  Progress Note     Chief Complaint:  Severe episode of recurrent major depressive disorder, without psychotic features ([address]     SUBJECTIVE:    PROGRESS:  Patience reports she is feeling good today  She is smiling and appears much brighter on examination. She states she is having a good day today. She has been working on a puzzle and interacting well with peers. She is feeling less depressed today. She is still not sure what led to her increase in depression prior to admission. She denies current thoughts of harm to herself or others  She is feeling less hopeless and helpless today  She reports she slept good last night. Has been utilizing Trazodone and feels it helps. Staff states she slept 7.5 hours continuous. She has been eating well on the unit  Endorses good medication compliance. Denies side effects. She has been attending all of the groups on the unit  She talked to her mother on the phone last night which went well.      Suicidal ideations: denies    Compliance with medications: good   Medication side effects: absent  ROS: Patient has new complaints:  no  Sleep quality: 7.5 hours continuous  Attending groups: yes      OBJECTIVE      Medications  Current Facility-Administered Medications: acetaminophen (TYLENOL) tablet 650 mg, 650 mg, Oral, Q4H PRN  ibuprofen (ADVIL;MOTRIN) tablet 400 mg, 400 mg, Oral, Q6H PRN  magnesium hydroxide (MILK OF MAGNESIA) 400 MG/5ML suspension 30 mL, 30 mL, Oral, Daily PRN  aluminum & magnesium hydroxide-simethicone (MAALOX) 200-200-20 MG/5ML suspension 30 mL, 30 mL, Oral, Q6H PRN  hydrOXYzine (ATARAX) tablet 50 mg, 50 mg, Oral, TID PRN  traZODone (DESYREL) tablet 50 mg, 50 mg, Oral, Nightly PRN  haloperidol lactate (HALDOL) injection 5 mg, 5 mg, Intramuscular, Q6H PRN **OR** haloperidol (HALDOL) tablet 5 mg, 5 mg, Oral, Q6H PRN LORazepam (ATIVAN) tablet 2 mg, 2 mg, Oral, Q6H PRN **OR** LORazepam (ATIVAN) injection 2 mg, 2 mg, Intramuscular, Q6H PRN  influenza quadrivalent split vaccine (FLUZONE;FLUARIX;FLULAVAL;AFLURIA) injection 0.5 mL, 0.5 mL, Intramuscular, Prior to discharge  venlafaxine (EFFEXOR XR) extended release capsule 37.5 mg, 37.5 mg, Oral, Daily with breakfast     Physical     height is 5' 6\" (1.676 m) and weight is 170 lb (77.1 kg). Her oral temperature is 98.9 °F (37.2 °C). Her blood pressure is 119/70 and her pulse is 67. Her respiration is 14 and oxygen saturation is 98%. Lab Results   Component Value Date    WBC 7.8 02/10/2021    HGB 12.5 02/10/2021    HCT 38.7 02/10/2021     02/10/2021    ALT 8 (L) 02/10/2021    AST 10 02/10/2021     02/10/2021    K 4.3 02/10/2021     02/10/2021    CREATININE 0.4 02/10/2021    BUN 8 02/10/2021    CO2 26 02/10/2021    TSH 3.56 03/02/2020    INR 1.0 03/02/2020          Mental Status Exam:   Level of consciousness:  Within normal limits  Appearance: Hospital attire, seated in chair, with good grooming and hygiene   Behavior/Motor: smiling, brightens with interaction.  No motor abnormalities noted  Attitude toward examiner:  Cooperative, attentive, good eye contact  Speech:  spontaneous, normal rate, normal volume and well articulated  Mood: \"good\" per patient  Affect: reactive  Thought processes:  linear, goal directed and coherent  Thought content:  denies homicidal ideation  Suicidal Ideation: denies suicidal ideation  Delusions:  no evidence of delusions  Perceptual Disturbance:  denies any perceptual disturbance  Cognition:  Oriented to self, location, time, and situation  Memory: age appropriate  Insight & Judgement: improving  Medication side effects:  denies      ASSESSMENT     Severe episode of recurrent major depressive disorder, without psychotic features ([facility]     PLAN    Patient's symptoms show improvement today  Continue current medication regimen Attempt to develop insight, psycho-education and supportive therapy conducted. Probable discharge: 1-2 days  Follow-up: TBD    Electronically signed by Gold Castle PA-C on 2/12/2021 at 8:34 AM Reviewed patient's current plan of care and vital signs with nursing staff. Psychiatry Attending Attestation     I assessed this patient and reviewed the case and plan of care with Gold Castle PA-C. I have reviewed the above documentation and I agree with the findings and treatment plan with the following updates. Patient was seen by Huong Lopez the unit and I evaluated patient as Tele visit. Patient feels better than before. Mood and affect are better. Patient reports fleeting suicidal thoughts with no intent or plan. Patient notes that these thoughts are occurring less frequently. Denies any homicidal thoughts, that was explored with the patient. Oriented to time place and person. Recent and remote memory is intact. Patient feels hopeful. Sleep and appetite is good. No side effect from medication reported. Side-effect of medication were discussed with the patient . Patient is responding to current treatment. Discharge soon, if patient continues to show improvement. Case discussed with the staff. More than 16 mins of the session was spent doing Supportive psychotherapy and coordinating care. Session lasted for over 30 mins. Hua Almonte is a 22 y.o. female being evaluated by a Virtual Visit (video visit) encounter to address concerns as mentioned above. A caregiver was present in the room along with the patient. Pursuant to the emergency declaration under the [facility], [address] [place] [address] authority and the [employer] Act, this Virtual Visit was conducted with patient's (and/or legal guardian's) consent, to reduce the patient's risk of exposure to COVID-19 and provide necessary medical care. Services were provided through a video synchronous discussion virtually to substitute for in-person visit by provider. Patient is present at [address] [place] [address] [address] [place] [address] and I am physically present at my home in [facility]     --Abad Fraire MD on 2/12/2021 at 4:41 PM    An electronic signature was used to authenticate this note. **This report has been created using voice recognition software. It may contain minor errors which are inherent in voice recognition technology. **

## 2021-02-12 NOTE — PLAN OF CARE
Problem: Depressive Behavior With or Without Suicide Precautions:  Goal: Able to verbalize and/or display a decrease in depressive symptoms  Description: Able to verbalize and/or display a decrease in depressive symptoms  Outcome: Met This Shift  Note: States her depression is less. Rates her mood a 6 with 10 being the best. Affect flat, sad. Fair eye contact. States she is hopeful for the future. Select peer interaction with a male peer. Goal: Ability to disclose and discuss suicidal ideas will improve  Description: Ability to disclose and discuss suicidal ideas will improve  Outcome: Met This Shift  Note: Denies suicidal ideations. Goal: Participates in care planning  Description: Participates in care planning  Outcome: Met This Shift  Note: Care plan reviewed with patient and verbalize understanding of the plan of care and contribute to goal setting. Problem: Discharge Planning:  Goal: Discharged to appropriate level of care  Description: Discharged to appropriate level of care  Outcome: Ongoing  Note: Discharge planning in progress.       Problem: Coping:  Goal: Ability to identify problematic behaviors that deter socialization will improve  Description: Ability to identify problematic behaviors that deter socialization will improve  Outcome: Ongoing

## 2021-02-12 NOTE — BH NOTE
Group Therapy Note    Date: 2/12/2021  Start Time: 11:00  End Time:  11:30  Number of Participants: 7    Type of Group: Psychoeducation / Healthy Living      Group's Goal:  Discussed worksheet called: \"How Well are you prepared to stay Well\". Notes:  Discussed self care, support system, relapse prevention, and mindfulness. Topics included coping skills, how to relax, your support system, eating healthy, avoid drugs and alcohol, practicing safe sex, and negative effects of smoking cigarettes. Status After Intervention:  Improved    Participation Level:  Active Listener and Interactive    Participation Quality: Appropriate, Attentive, Sharing and Supportive      Speech:  normal      Thought Process/Content: Logical      Affective Functioning: calm and brightens, smiling and social      Mood: calm and brightens      Level of consciousness:  Alert, Oriented x4 and Attentive      Response to Learning: Able to verbalize current knowledge/experience, Able to verbalize/acknowledge new learning, Able to retain information, Capable of insight and Able to change behavior      Endings: None Reported    Modes of Intervention: Education, Support and Socialization      Discipline Responsible: Registered Nurse      Signature:  AMAN Shultz RN MSN

## 2021-02-12 NOTE — PATIENT CARE CONFERENCE
[facility]  Day 3 Interdisciplinary Treatment Plan NOTE    Review Date & Time: 2/12/21 1001    Patient was in treatment team    Admission Type:   Admission Type: Voluntary    Reason for admission:  Reason for Admission: suicidal thoughts, depression  Estimated Length of Stay Update:  3-5 days  Estimated Discharge Date Update: 3-5 days    PATIENT STRENGTHS:  Patient Strengths Strengths: Communication, Employment  Patient Strengths and Limitations:Limitations: Difficulty problem solving/relies on others to help solve problems, Tendency to isolate self  Addictive Behavior:Addictive Behavior  In the past 3 months, have you felt or has someone told you that you have a problem with:  : None  Do you have a history of Chemical Use?: No  Do you have a history of Alcohol Use?: No  Do you have a history of Street Drug Abuse?: Yes  Medical Problems:History reviewed. No pertinent past medical history. Risk:  Fall RiskTotal: 57  Gurpreet Scale Gurpreet Scale Score: 22  BVC Total: 0  Change in scores no.  Changes to plan of Care no    Status EXAM:   Status and Exam  Normal: No  Facial Expression: Flat, Sad  Affect: Blunt  Level of Consciousness: Alert  Mood:Normal: No  Mood: Depressed, Sad  Motor Activity:Normal: Yes  Motor Activity: Decreased  Interview Behavior: Cooperative  Preception: [place] to Person, Suellen Erasto to Time, [place] to Place, [place] to Situation  Attention:Normal: No  Attention: Distractible  Thought Processes: Blocking  Thought Content:Normal: Yes  Hallucinations: None  Delusions: No  Memory:Normal: Yes  Insight and Judgment: No  Insight and Judgment: Poor Insight, Poor Judgment  Present Suicidal Ideation: No  Present Homicidal Ideation: No    Daily Assessment Last Entry:   Daily Sleep (WDL): Within Defined Limits         Patient Currently in Pain: Denies  Daily Nutrition (WDL): Within Defined Limits    Patient Monitoring:  Frequency of Checks: 4 times per hour, close    Psychiatric Symptoms: Depression Symptoms  Depression Symptoms: Impaired concentration, Sleep disturbance  Anxiety Symptoms  Anxiety Symptoms: No problems reported or observed. Nay Symptoms  Nay Symptoms: No problems reported or observed. Psychosis Symptoms  Delusion Type: No problems reported or observed. Suicide Risk CSSR-S:  1) Within the past month, have you wished you were dead or wished you could go to sleep and not wake up? : Yes  2) Have you actually had any thoughts of killing yourself? : Yes  3) Have you been thinking about how you might kill yourself? : No  5) Have you started to work out or worked out the details of how to kill yourself? Do you intend to carry out this plan? : No  6) Have you ever done anything, started to do anything, or prepared to do anything to end your life?: No  Change in Result no Change in Plan of care no      EDUCATION:   Learner Progress Toward Treatment Goals: Reviewed results and recommendations of this team    Method: Individual    Outcome: Needs reinforcement    PATIENT GOALS: Patient did not want to identify a goal.    PLAN/TREATMENT RECOMMENDATIONS UPDATE:  1. How are you progressing toward meeting your main treatment goal?   Patient did not want to identify a goal.  2.  Are there discharge barriers/lingering problems that need to be addressed? None reported      3. Do you have the ability to pay for your medications? [employer]      4. How is your group participation?      Patient has been attending most groups and participating  GOALS UPDATE:   Time frame for Short-Term Goals: Daily      FÉLIX Hendricks

## 2021-02-12 NOTE — BH NOTE
PLAN OF CARE:     Start Time:  0900  End Time:   0930    Group Topic:  Daily Goals    Group Type:   Goal Group    Intervention/Goal:  To increase support and identify daily goals    Attendance:  Attended group     Affect:  Brightens with interaction    Behavior:  Cooperative but guarded    Response:  appropriate    Daily Goal:    Work on a puzzle.      Progress:  Progressing to goal

## 2021-02-12 NOTE — PROGRESS NOTES
Group Therapy Note    Date: 2/12/2021  Start Time: 1330  End Time:  1430  Number of Participants: 8    Type of Group: Psychotherapy      Notes:  Pt was present as peers shared their personal history with depression and thoughts of self harm. Pt interacts well with peers and identified with common experiences. Supportive of peers. Status After Intervention:  Improved    Participation Level:  Active Listener and Interactive    Participation Quality: Appropriate, Attentive, Sharing and Supportive      Speech:  normal      Thought Process/Content: Logical  Linear      Affective Functioning: Congruent      Mood: dysphoric      Level of consciousness:  Alert, Oriented x4 and Attentive      Response to Learning: Able to verbalize current knowledge/experience, Able to verbalize/acknowledge new learning, Able to retain information, Capable of insight, Able to change behavior and Progressing to goal      Endings: None Reported    Modes of Intervention: Education, Support, Socialization, Exploration, Clarifying, Problem-solving and Activity      Discipline Responsible: /Counselor      Signature:  FÉLIX Gutierrez

## 2021-02-13 PROCEDURE — 6370000000 HC RX 637 (ALT 250 FOR IP): Performed by: PSYCHIATRY & NEUROLOGY

## 2021-02-13 PROCEDURE — 99231 SBSQ HOSP IP/OBS SF/LOW 25: CPT | Performed by: NURSE PRACTITIONER

## 2021-02-13 PROCEDURE — 1240000000 HC EMOTIONAL WELLNESS R&B

## 2021-02-13 PROCEDURE — 6370000000 HC RX 637 (ALT 250 FOR IP): Performed by: PHYSICIAN ASSISTANT

## 2021-02-13 RX ADMIN — VENLAFAXINE HYDROCHLORIDE 37.5 MG: 37.5 CAPSULE, EXTENDED RELEASE ORAL at 09:20

## 2021-02-13 RX ADMIN — TRAZODONE HYDROCHLORIDE 50 MG: 50 TABLET ORAL at 21:42

## 2021-02-13 ASSESSMENT — PAIN SCALES - GENERAL
PAINLEVEL_OUTOF10: 0
PAINLEVEL_OUTOF10: 0

## 2021-02-13 NOTE — PROGRESS NOTES
Topic Purpose and benefits of 12 step support meetings     Start 1445     End 1530     Participation passive involvement , active with encouragement        Response verbalized understanding of purpose and benefits of attending 12 step support groups        Behavior appropriate, attentive, sharing

## 2021-02-13 NOTE — PROGRESS NOTES
Psychiatry Progress Note      CC:  Severe episode of recurrent major depressive disorder, without psychotic features ([facility]      Subjective    Progress:  Patience reports feeling better since admission. Reports depression is less. Denies feelings of harm towards self or others. Reports Effexor seems to be helping her depression. Denies having side effects from meds. Good med compliance is verified. Reports appetite is good and slept well last night. Verified slept 8 hours continuous. States she has been attending groups. Denies getting any visits but has been talking to mother on phone and reports mother is very supportive. Objective  /77   Pulse 71   Temp 96.7 °F (35.9 °C) (Tympanic)   Resp 16   Ht 5' 6\" (1.676 m)   Wt 170 lb (77.1 kg)   LMP 01/04/2021 (Approximate)   SpO2 98%   Breastfeeding No   BMI 27.44 kg/m²      MSE:  Level of consciousness: Alert  Appearance: hospital attire, in chair and fair grooming   Behavior/Motor: no abnormalities noted   Attitude toward examiner: cooperative   Speech: Normal volume, goal directed, NRR  Mood: Euthymic  Affect: Reactive  Thought processes: Linear and goal directed   Suicidal Ideation: Denies suicidal ideations  Homicidal ideation: Denies homicidal ideations  Delusions: No evidence of delusions is observed  Perceptual Disturbance: Denies AH/VH;  No evidence of psychosis is observed. Cognition: Oriented to person, place, time and situation   Concentration fair   Memory intact   Insight: Improved   Judgment: Improved    Assessment:   Severe episode of recurrent major depressive disorder, without psychotic features ([facility]      Plan:  Continue current meds as ordered  Continue to encourage group attendance.       Ally Waddell, CNP  0-

## 2021-02-13 NOTE — PLAN OF CARE
Problem: Depressive Behavior With or Without Suicide Precautions:  Goal: Able to verbalize and/or display a decrease in depressive symptoms  Description: Able to verbalize and/or display a decrease in depressive symptoms  Outcome: Ongoing  Note: Pt denies feeling depressed and states mood is \"good\" when asked to rate on a scale of 0-10. Pt brightens during assessment. Goal: Participates in care planning  Description: Participates in care planning  Outcome: Ongoing  Note: The patient participates in interdisciplinary care planning. Problem: Discharge Planning:  Goal: Discharged to appropriate level of care  Description: Discharged to appropriate level of care  Outcome: Ongoing  Note: Patient will be discharged to safe, appropriate level of care. Patient will work with interdisciplinary team towards meeting discharge needs. Problem: Coping:  Goal: Ability to identify problematic behaviors that deter socialization will improve  Description: Ability to identify problematic behaviors that deter socialization will improve  2/13/2021 0207 by Michele Flores RN  Outcome: Ongoing  2/12/2021 1554 by Kala Dickey  Outcome: Met This Shift   Care plan reviewed with patient. Patient verbalize understanding of the plan of care and contribute to goal setting.

## 2021-02-13 NOTE — GROUP NOTE
Group Therapy Note    Date: 2/13/2021    Group Start Time: 1000  Group End Time: 1030  Group Topic: [employer]    [facility] 4E    AKANKSHA Lawler    Group Therapy Note    Attendees: 9         Patient's Goal:  \"Catch up on rest.\"    Notes:  Pt progress is ongoing    Status After Intervention:  Improved    Participation Level:  Active Listener and Minimal    Participation Quality: Appropriate, Attentive and Resistant      Speech:  hesitant      Thought Process/Content: Linear      Affective Functioning: Flat      Mood: euthymic      Level of consciousness:  Alert, Oriented x4 and Attentive      Response to Learning: Able to verbalize current knowledge/experience, Able to verbalize/acknowledge new learning and Progressing to goal      Endings: None Reported    Modes of Intervention: Education, Support, Socialization, Exploration, Clarifying, Activity, Limit-setting and Reality-testing      Discipline Responsible: Psychoeducational Specialist      Signature:  AKANKSHA Szymanski

## 2021-02-13 NOTE — GROUP NOTE
Group Therapy Note    Date: 2/13/2021    Group Start Time: 1330  Group End Time: 1430  Group Topic: Psychoeducation    [facility] 4E    TAN Jean-Baptiste LSW        Group Therapy Note    Attendees: 8         Patient's Goal:  Identify feelings and coping skills on good and bad days. Notes:  Patient participated in group. Patient shared and supported other group members. Status After Intervention:  Improved    Participation Level:  Active Listener and Interactive    Participation Quality: Appropriate, Attentive, Sharing and Supportive      Speech:  normal      Thought Process/Content: Logical  Linear      Affective Functioning: Congruent      Mood: euthymic      Level of consciousness:  Alert, Oriented x4 and Attentive      Response to Learning: Able to verbalize current knowledge/experience, Able to verbalize/acknowledge new learning, Able to retain information, Capable of insight, Able to change behavior and Progressing to goal      Endings: None Reported    Modes of Intervention: Education, Support, Socialization, Exploration, Clarifying, Problem-solving and Activity      Discipline Responsible: /Counselor      Signature:  TAN Jean-Baptiste LSW

## 2021-02-13 NOTE — PLAN OF CARE
Problem: Depressive Behavior With or Without Suicide Precautions:  Goal: Participates in care planning  Description: Participates in care planning  2/13/2021 1534 by Janice Hoyos RN  Outcome: Met This Shift  Note: Support provided for pt's participation in treatment pt developed short term goal of \"to attend groups\"  2/13/2021 0207 by Sasha Lopes RN  Outcome: Ongoing  Note: The patient participates in interdisciplinary care planning. Problem: Role Relationship:  Goal: Ability to demonstrate positive changes in social behaviors and relationships will improve  Description: Ability to demonstrate positive changes in social behaviors and relationships will improve  2/13/2021 1401 by Nilam Larry  Outcome: Met This Shift  Note: Pt has attended 3/3 groups that have been offered on the unit so far this shift. Pt has been out on the unit and has been seen interacting with peers. Pt will be encouraged to continue group attendance and to continue to interact with peers. Pt has met the socialization goal for this shift. Problem: Depressive Behavior With or Without Suicide Precautions:  Goal: Able to verbalize and/or display a decrease in depressive symptoms  Description: Able to verbalize and/or display a decrease in depressive symptoms  2/13/2021 1534 by Janice Hoyos RN  Outcome: Ongoing  Note: Pt compliant with her medications. Rates her mood as a 7 out of a scale of 1 to 10 with 10 being normal.   2/13/2021 0207 by Sasha Lopes RN  Outcome: Ongoing  Note: Pt denies feeling depressed and states mood is \"good\" when asked to rate on a scale of 0-10. Pt brightens during assessment.      Problem: Discharge Planning:  Goal: Discharged to appropriate level of care  Description: Discharged to appropriate level of care  2/13/2021 1534 by Janice Hoyos RN  Outcome: Ongoing Note: Pt working with treatment treatment for optimal discharge plans. Pt shared she will be staying with her Mom in [facility] we discussed the support she can obtain form [facility] in [facility] for ongoing outpatient counseling. Pt voiced understanding  2/13/2021 0207 by Jj Cruz RN  Outcome: Ongoing  Note: Patient will be discharged to safe, appropriate level of care. Patient will work with interdisciplinary team towards meeting discharge needs. Problem: Coping:  Goal: Ability to identify problematic behaviors that deter socialization will improve  Description: Ability to identify problematic behaviors that deter socialization will improve  2/13/2021 0207 by Jj Cruz RN  Outcome: Ongoing   Care plan reviewed with patient.   Patient does verbalize understanding of the plan of care and did  contribute to goal setting by developing a short term goal to \"attend groups\" Pt has been attending groups

## 2021-02-13 NOTE — PROGRESS NOTES
Group Therapy Note    Date: 2/12/2021  Start Time: 2000  End Time:  2020    Type of Group: Relaxation    Notes:  Cooperative     Status After Intervention:  Unchanged    Participation Level: Minimal    Participation Quality: Appropriate      Speech:  normal      Thought Process/Content: Logical      Affective Functioning: Congruent      Mood: normal      Level of consciousness:  Alert      Response to Learning: Able to change behavior      Endings: None Reported    Modes of Intervention: Education and Support      Discipline Responsible: Registered Nurse      Signature:  Mike Ellis RN

## 2021-02-13 NOTE — GROUP NOTE
Group Therapy Note    Date: 2/13/2021    Group Start Time: 1100  Group End Time: 1200  Group Topic: Recreational    [facility]    AKANKSHA Lawler    Group Therapy Note    Attendees: 12         Patient's Goal:  Pt will improve socialization and focus on music as a coping mechanism     Notes:  Pt was present in group for the entire session. Pt was interactive with peers, supportive, and actively participated in listing different songs that are listened to, to cope with stressors. Status After Intervention:  Improved    Participation Level:  Active Listener and Interactive    Participation Quality: Appropriate, Attentive and Sharing      Speech:  normal      Thought Process/Content: Linear      Affective Functioning: Congruent      Mood: euthymic      Level of consciousness:  Alert, Oriented x4 and Attentive      Response to Learning: Able to verbalize current knowledge/experience, Able to verbalize/acknowledge new learning, Able to retain information, Capable of insight, Able to change behavior and Progressing to goal      Endings: None Reported    Modes of Intervention: Education, Support, Socialization, Exploration, Clarifying, Activity and Media      Discipline Responsible: Psychoeducational Specialist      Signature:  Karleen Phoenix, CTRS

## 2021-02-14 VITALS
HEIGHT: 66 IN | BODY MASS INDEX: 27.32 KG/M2 | WEIGHT: 170 LBS | HEART RATE: 78 BPM | RESPIRATION RATE: 16 BRPM | DIASTOLIC BLOOD PRESSURE: 74 MMHG | SYSTOLIC BLOOD PRESSURE: 120 MMHG | OXYGEN SATURATION: 99 % | TEMPERATURE: 97.3 F

## 2021-02-14 PROCEDURE — 6370000000 HC RX 637 (ALT 250 FOR IP): Performed by: PHYSICIAN ASSISTANT

## 2021-02-14 PROCEDURE — 99238 HOSP IP/OBS DSCHRG MGMT 30/<: CPT | Performed by: NURSE PRACTITIONER

## 2021-02-14 RX ORDER — TRAZODONE HYDROCHLORIDE 50 MG/1
50 TABLET ORAL NIGHTLY PRN
Qty: 10 TABLET | Refills: 0 | Status: SHIPPED | OUTPATIENT
Start: 2021-02-14

## 2021-02-14 RX ORDER — VENLAFAXINE HYDROCHLORIDE 37.5 MG/1
37.5 CAPSULE, EXTENDED RELEASE ORAL
Qty: 30 CAPSULE | Refills: 0 | Status: SHIPPED | OUTPATIENT
Start: 2021-02-15

## 2021-02-14 RX ADMIN — VENLAFAXINE HYDROCHLORIDE 37.5 MG: 37.5 CAPSULE, EXTENDED RELEASE ORAL at 08:10

## 2021-02-14 NOTE — PROGRESS NOTES
Behavioral Health   Discharge Note    Pt discharged with followings belongings:   Dentures: None  Vision - Corrective Lenses: None  Hearing Aid: None  Jewelry: Ring  Body Piercings Removed: N/A  Clothing: Sweater, Socks, Undergarments (Comment), Jacket / coat, Shirt, Footwear  Were All Patient Medications Collected?: Not Applicable  Other Valuables: Cell phone, Money (Comment), Wallet($10.00)   Personal belongings obtained from locker and returned to patient. Patient left department with staff via ambulatory. Discharged to private residence [address] [place] [address] [facility] form photocopy original from admission and provided to pt at discharge No.  Patient education on aftercare instructions yes  Bridge Appointment completed: Reviewed Discharge Instructions with patient. Patient verbalizes understanding and agreement with the discharge plan using the teachback method.    Patient verbalize understanding of AVS: yes    Status EXAM upon discharge:  Status and Exam  Normal: Yes  Facial Expression: Brightened  Affect: Blunt  Level of Consciousness: Alert  Mood:Normal: No  Mood: Depressed  Motor Activity:Normal: Yes  Motor Activity: Decreased  Interview Behavior: Cooperative  Preception: [place] to Person, Marysol Henok to Time, [place] to Place, [place] to Situation  Attention:Normal: Yes  Attention: Distractible  Thought Processes: Circumstantial  Thought Content:Normal: Yes  Hallucinations: None  Delusions: No  Memory:Normal: Yes  Insight and Judgment: No  Insight and Judgment: Poor Judgment, Poor Insight  Present Suicidal Ideation: No  Present Homicidal Ideation: No Pt voiced understanding of discharge plans. She reports feeling \"hopeful for recovery\" and plans to return to her Mothers home in [address]. She denies having thoughts of self harming behaviors. We discussed the importance to challenge negative thinking, take her medications as prescribed attend all follow up appointments and be honest with her support sytem. Pt voiced appreciation for support and educations while in treatment.      Jean Marie Gamble RN-BC

## 2021-02-14 NOTE — GROUP NOTE
Group Therapy Note    Date: 2/14/2021    Group Start Time: 1663  Group End Time: 1055  Group Topic: Recreational    [facility] 4[facility]    AKANKSHA Lawler    Group Therapy Note    Attendees: 9         Patient's Goal:  Pt will improve socialization and knowledge of coping skills focusing on self-esteem and supporting others. Notes:  Pt was present during group. Pt was asked to write a Restrepo's Day card to self, listing positive attributes that they like about themselves. Pt then was asked to compliment others in the group as support. Pt actively participated in group. Status After Intervention:  Improved    Participation Level:  Active Listener and Interactive    Participation Quality: Appropriate, Attentive and Sharing      Speech:  normal      Thought Process/Content: Linear      Affective Functioning: Congruent      Mood: euthymic      Level of consciousness:  Alert, Oriented x4 and Attentive      Response to Learning: Able to verbalize current knowledge/experience, Able to verbalize/acknowledge new learning, Able to retain information, Capable of insight, Able to change behavior and Progressing to goal      Endings: None Reported    Modes of Intervention: Education, Support, Socialization, Exploration, Clarifying, Activity and Confrontation      Discipline Responsible: Psychoeducational Specialist      Signature:  AKANKSHA Mott

## 2021-02-14 NOTE — SUICIDE SAFETY PLAN
SAFETY PLAN     A suicide Safety Plan is a document that supports someone when they are having thoughts of suicide.     Warning Signs that indicate a suicidal crisis may be developing: What (situations, thoughts, feelings, body sensations, behaviors, etc.) do you experience that lets you know you are beginning to think about suicide? 1. Not able to get out of bed  2.dark thoughts  3.spiral thinking     Internal Coping Strategies:  What things can I do (relaxation techniques, hobbies, physical activities, etc.) to take my mind off my problems without contacting another person? 1.watch a funny movie  2.meditation   3.walking my dog      People and social settings that provide distraction: Who can I call or where can I go to distract me? 1. Name:Mabel PeterMom)Phone:[phone]  2. Name:Ivory (Friend) [facility]:[phone]  3. Place:[place]            4. Place:Library     People whom I can ask for help: Who can I call when I need help - for example, friends, family, clergy, someone else? 1. Name:Mabel PeterMom)Phone:[phone]     2. Marleen (Friend)    [facility]:[phone]     3. Name:Dad               Phone:      Professionals or [facility] agencies I can contact during a crisis: Who can I call for help - for example, my doctor, my psychiatrist, my psychologist, a mental health provider, a suicide hotline? See Discharge Summary  1. Clinician Name:    Phone:       Clinician Pager or Emergency Contact #:   2.  Clinician Name:    Phone:      Clinician Pager or Emergency Contact #:      3. Suicide Prevention Lifeline: 2-280-956-TALK (9561)     4. [address] [place] [address] Emergency Services -  for example, [facility] suicide hotline, [facility] Hotline:       Emergency Services Address:See discharge summary      Emergency Services Phone:     Making the environment safe: How can I make my environment (house/apartment/living space) safer? For example, can I remove guns, medications, and other items? 1.Buying 1 razor at a time and throw it away  2. Get more lamps so there is more light in my place

## 2021-02-14 NOTE — PLAN OF CARE
Problem: Depressive Behavior With or Without Suicide Precautions:  Goal: Able to verbalize and/or display a decrease in depressive symptoms  Description: Able to verbalize and/or display a decrease in depressive symptoms  2/13/2021 2329 by Angelica Jones RN  Outcome: Ongoing  Note: the patient denies feeling depressed and brightened during assessment. 2/13/2021 1534 by Lela Jeter RN  Outcome: Ongoing  Note: Pt compliant with her medications. Rates her mood as a 7 out of a scale of 1 to 10 with 10 being normal.   Goal: Participates in care planning  Description: Participates in care planning  2/13/2021 2329 by Angelica Jones RN  Outcome: Ongoing  Note: The patient participates in interdisciplinary care planning. 2/13/2021 1534 by eLla Jeter RN  Outcome: Met This Shift  Note: Support provided for pt's participation in treatment pt developed short term goal of \"to attend groups\"     Problem: Discharge Planning:  Goal: Discharged to appropriate level of care  Description: Discharged to appropriate level of care  2/13/2021 2329 by Angelica Jones RN  Outcome: Ongoing  Note: Patient will be discharged to safe, appropriate level of care. Patient will work with interdisciplinary team towards meeting discharge needs. 2/13/2021 1534 by Lela Jeter RN  Outcome: Ongoing  Note: Pt working with treatment treatment for optimal discharge plans. Pt shared she will be staying with her Mom in [facility] we discussed the support she can obtain form [facility] [employer] [facility] in [place] for ongoing outpatient counseling.  Pt voiced understanding     Problem: Coping:  Goal: Ability to identify problematic behaviors that deter socialization will improve  Description: Ability to identify problematic behaviors that deter socialization will improve  Outcome: Ongoing     Problem: Role Relationship: Goal: Ability to demonstrate positive changes in social behaviors and relationships will improve  Description: Ability to demonstrate positive changes in social behaviors and relationships will improve  2/13/2021 2329 by Radha Boo RN  Outcome: Ongoing  2/13/2021 1401 by Litzy Bonilla  Outcome: Met This Shift  Note: Pt has attended 3/3 groups that have been offered on the unit so far this shift. Pt has been out on the unit and has been seen interacting with peers. Pt will be encouraged to continue group attendance and to continue to interact with peers. Pt has met the socialization goal for this shift. Care plan reviewed with patient. Patient verbalize understanding of the plan of care and contribute to goal setting.

## 2021-02-14 NOTE — GROUP NOTE
Group Therapy Note    Date: 2/14/2021    Group Start Time: 1100  Group End Time: 5194  Group Topic: Psychotherapy    [facility] 4E    FÉLIX Diop        Group Therapy Note    Attendees: 5         Patient's Goal:  Identify things that patient loves and how these things can be utilized as coping skills in different situations. Notes:  Patient was pleasant and interactive with peers. Patient contributed to group conversation in a positive manner. Status After Intervention:  Improved    Participation Level:  Active Listener and Interactive    Participation Quality: Appropriate      Speech:  normal      Thought Process/Content: Logical      Affective Functioning: Congruent      Mood: euthymic      Level of consciousness:  Alert, Oriented x4 and Attentive      Response to Learning: Able to verbalize current knowledge/experience, Able to verbalize/acknowledge new learning, Able to retain information, Capable of insight, Able to change behavior and Progressing to goal      Endings: None Reported    Modes of Intervention: Education, Support and Socialization      Discipline Responsible: /Counselor      Signature:  FÉLIX Diop

## 2021-02-14 NOTE — GROUP NOTE
Group Therapy Note    Date: 2/14/2021    Group Start Time: 0930  Group End Time: 1000  Group Topic: [address] Adult Psych 4E    AKANKSHA Lawler    Group Therapy Note    Attendees: 10         Patient's Goal:  \"See how many laps I can do around the hugo. Finish my safety plan. \"    Notes:  Pt progress is ongoing. Status After Intervention:  Improved    Participation Level:  Active Listener and Interactive    Participation Quality: Appropriate, Attentive and Sharing      Speech:  normal      Thought Process/Content: Linear      Affective Functioning: Congruent      Mood: euthymic      Level of consciousness:  Alert, Oriented x4 and Attentive      Response to Learning: Able to verbalize current knowledge/experience, Able to verbalize/acknowledge new learning, Able to retain information and Progressing to goal      Endings: None Reported    Modes of Intervention: Education, Support, Socialization, Exploration, Clarifying, Activity, Limit-setting and Reality-testing      Discipline Responsible: Psychoeducational Specialist      Signature:  AKANKSHA Sutherland

## 2021-02-14 NOTE — PROGRESS NOTES
Group Therapy Note    Date: 2/13/2021  Start Time: 2000  End Time:  2020    Type of Group: Relaxation    Patient's Goal:  \"Attend groups\"    Notes:  cooperative    Status After Intervention:  Unchanged    Participation Level:  Active Listener    Participation Quality: Appropriate      Speech:  normal      Thought Process/Content: Logical      Affective Functioning: Congruent      Mood: normal      Level of consciousness:  Alert      Response to Learning: Progressing to goal      Endings: None Reported    Modes of Intervention: Education and Support      Discipline Responsible: Registered Nurse      Signature:  Iva Ruiz RN

## 2021-02-14 NOTE — DISCHARGE SUMMARY
Psychiatry Discharge Summary        Discharged Dx: Major Depressive D/O recurrent severe without psychosis      HPI:  Cassidy Cody is a 22 y.o. female with a history of depression and anxiety who presented to the emergency department under voluntary status for suicidal ideation. Per the ED social work note: Patient is single and has no children. She is employed at [employer]. Patient resides with her family who are supportive. Patient reports feeling numb, sad, and difficulty with sleep. Patient reports she is sad often with suicidal thoughts . Patient states ' Today I wanted to kill myself more then the other days'. Patient reports she was prescribed medication with [facility] for depression however has not been compliant for the past six months. Patient reports the medication was not helping. Patient is tearful with fair eye contact. Patient is cooperative with the interview. Patient denies delusions/hallucinations.      Patience reports \"I had a bad night. \" When asked why she had a bad night, \"I don't know. \" She states she has been experiencing suicidal ideation for some time that was the worse its ever been yesterday. She is unsure how long she has been experiencing suicidal ideation. She denies any specific plan or intent. She reports she has been experiencing depression for awhile that has been progressively getting worse. She denies any specific stressors. Per 4E nursing staff, her sister went to [facility] a week ago and she has been worried about her. She endorses feeling down and sad for more days than not. She has not been sleeping well. She has trouble falling and staying asleep. She endorses racing thoughts at night about the past and future. She still feels tired in the morning when she wakes up most days. She has been eating okay at home. She has been having low energy and motivation. Attention and concentration have been poor. She has been feeling worthless, hopeless and helpless. She has been dealing with high anxiety recently. She reports she experiences racing thoughts and is unable to calm down with her anxiety. She has been having panic attacks. She is unsure how often she has been having them. She states she experiences racing thoughts, racing heart, trouble breathing and is unable to calm down when she has a panic attack. She states her panic attacks last for an hour. Hospital Course:  Upon admission to E4 psychiatric unit. Niyah was provided a safe secure environment. Introduced to unit milieu, groups and individual therapies. Medication management was also provided. Niyah participated in all treatment modalities and endorsed significant improvement within few days of hospital care. Niyah reports depression is much better. Reports Effexor is working \"great. \" Erwin Lg no longer voices feelings of self harm. Ate and slept well while in hospital. Niyah reports being more hopeful about the future and states she is ready for discharge. Reports being better able to handle stress since being admitted. On morning rounds on 2-14-21 it was decided that Niyah has achieved maximum benefit from hospital care and can be discharged home with agreement to F/U with [facility] for continued medication management and counseling. Also to continue her discharge medications.      Discharge Medications:  Effexor XR 37.5mg po q am   Trazodone 50mg po q HS PRN Sleep  Denies need for Atarax     MSE:  Level of consciousness: Alert  Appearance: hospital attire, in chair and fair grooming   Behavior/Motor: no abnormalities noted   Attitude toward examiner: cooperative   Speech: Normal volume, goal directed, NRR  Mood: Euthymic  Affect: Reactive  Thought processes: Linear and goal directed   Suicidal Ideation: Denies suicidal ideations  Homicidal ideation: Denies homicidal ideations  Delusions: No evidence of delusions is observed Perceptual Disturbance: Denies AH/VH;  No evidence of psychosis is observed. Cognition: Oriented to person, place, time and situation   Concentration fair   Memory intact   Insight: Improved  Judgment: Improved      Disposition:  Patience can be discharged home with agreement to F/U with [address] for continued medication management and counseling. Also to continue her discharge medications.        Lenore Barry CNP   Time Spent: 25 minutes

## 2021-02-15 ENCOUNTER — TELEPHONE (OUTPATIENT)
Dept: PSYCHIATRY | Age: 21
End: 2021-02-15

## 2021-03-03 ENCOUNTER — HOSPITAL ENCOUNTER (OUTPATIENT)
Age: 21
Setting detail: SPECIMEN
Discharge: HOME | End: 2021-03-03
Payer: COMMERCIAL

## 2021-03-04 LAB
ABSOLUTE EOS #: 0.17 K/UL (ref 0–0.44)
ABSOLUTE IMMATURE GRANULOCYTE: <0.03 K/UL (ref 0–0.3)
ABSOLUTE LYMPH #: 1.93 K/UL (ref 1.2–5.2)
ABSOLUTE MONO #: 0.76 K/UL (ref 0.1–1.4)
ALBUMIN SERPL-MCNC: 4.4 G/DL (ref 3.5–5.2)
ALBUMIN/GLOBULIN RATIO: 1.7 (ref 1–2.5)
ALP BLD-CCNC: 37 U/L (ref 35–104)
ALT SERPL-CCNC: 8 U/L (ref 5–33)
ANION GAP SERPL CALCULATED.3IONS-SCNC: 11 MMOL/L (ref 9–17)
AST SERPL-CCNC: 14 U/L
BASOPHILS # BLD: 0 % (ref 0–2)
BASOPHILS ABSOLUTE: <0.03 K/UL (ref 0–0.2)
BILIRUB SERPL-MCNC: 0.26 MG/DL (ref 0.3–1.2)
BUN BLDV-MCNC: 4 MG/DL (ref 6–20)
BUN/CREAT BLD: ABNORMAL (ref 9–20)
CALCIUM SERPL-MCNC: 9.6 MG/DL (ref 8.6–10.4)
CHLORIDE BLD-SCNC: 103 MMOL/L (ref 98–107)
CHOLESTEROL/HDL RATIO: 2.4
CHOLESTEROL: 112 MG/DL
CO2: 23 MMOL/L (ref 20–31)
CREAT SERPL-MCNC: 0.47 MG/DL (ref 0.5–0.9)
DIFFERENTIAL TYPE: ABNORMAL
EOSINOPHILS RELATIVE PERCENT: 2 % (ref 1–4)
GFR AFRICAN AMERICAN: >60 ML/MIN
GFR NON-AFRICAN AMERICAN: >60 ML/MIN
GFR SERPL CREATININE-BSD FRML MDRD: ABNORMAL ML/MIN/{1.73_M2}
GFR SERPL CREATININE-BSD FRML MDRD: ABNORMAL ML/MIN/{1.73_M2}
GLUCOSE BLD-MCNC: 79 MG/DL (ref 70–99)
HCT VFR BLD CALC: 39.3 % (ref 36.3–47.1)
HDLC SERPL-MCNC: 46 MG/DL
HEMOGLOBIN: 12.5 G/DL (ref 11.9–15.1)
IMMATURE GRANULOCYTES: 0 %
LDL CHOLESTEROL: 56 MG/DL (ref 0–130)
LYMPHOCYTES # BLD: 24 % (ref 25–45)
MCH RBC QN AUTO: 28.5 PG (ref 25.2–33.5)
MCHC RBC AUTO-ENTMCNC: 31.8 G/DL (ref 28.4–34.8)
MCV RBC AUTO: 89.7 FL (ref 82.6–102.9)
MONOCYTES # BLD: 9 % (ref 2–8)
NRBC AUTOMATED: 0 PER 100 WBC
PDW BLD-RTO: 12.7 % (ref 11.8–14.4)
PLATELET # BLD: 295 K/UL (ref 138–453)
PLATELET ESTIMATE: ABNORMAL
PMV BLD AUTO: 11.9 FL (ref 8.1–13.5)
POTASSIUM SERPL-SCNC: 4.1 MMOL/L (ref 3.7–5.3)
RBC # BLD: 4.38 M/UL (ref 3.95–5.11)
RBC # BLD: ABNORMAL 10*6/UL
SEG NEUTROPHILS: 65 % (ref 34–64)
SEGMENTED NEUTROPHILS ABSOLUTE COUNT: 5.28 K/UL (ref 1.8–8)
SODIUM BLD-SCNC: 137 MMOL/L (ref 135–144)
T3 TOTAL: 165 NG/DL (ref 60–181)
THYROXINE, FREE: 1.98 NG/DL (ref 0.93–1.7)
TOTAL PROTEIN: 7 G/DL (ref 6.4–8.3)
TRIGL SERPL-MCNC: 51 MG/DL
TSH SERPL DL<=0.05 MIU/L-ACNC: <0.01 MIU/L (ref 0.3–5)
VLDLC SERPL CALC-MCNC: NORMAL MG/DL (ref 1–30)
WBC # BLD: 8.2 K/UL (ref 4.5–13.5)
WBC # BLD: ABNORMAL 10*3/UL